# Patient Record
Sex: MALE | Race: WHITE | NOT HISPANIC OR LATINO | URBAN - METROPOLITAN AREA
[De-identification: names, ages, dates, MRNs, and addresses within clinical notes are randomized per-mention and may not be internally consistent; named-entity substitution may affect disease eponyms.]

---

## 2018-09-06 ENCOUNTER — OFFICE VISIT (OUTPATIENT)
Dept: URBAN - METROPOLITAN AREA CLINIC 32 | Facility: CLINIC | Age: 43
End: 2018-09-06
Payer: COMMERCIAL

## 2018-09-06 PROCEDURE — 99213 OFFICE O/P EST LOW 20 MIN: CPT | Performed by: OPHTHALMOLOGY

## 2018-09-06 RX ORDER — BIMATOPROST 0.1 MG/ML
0.01 % SOLUTION/ DROPS OPHTHALMIC
Qty: 7.5 | Refills: 4 | Status: INACTIVE
Start: 2018-09-06 | End: 2019-01-24

## 2018-09-06 ASSESSMENT — INTRAOCULAR PRESSURE
OD: 11
OS: 11

## 2018-09-06 NOTE — IMPRESSION/PLAN
Impression: Low-tension glaucoma, bilateral, mild stage: V03.1565. ONH drusen ou --
IOP OU lowered effectively with lumigan -
ONH stable ou Plan: Discussed diagnosis, explained and understood by patient. Discussed IOP/ONH/Glaucoma management and risks. continue Lumigan OU qhs.
stressed compliance with gtts and risk of progression and vision loss if not compliant with treatment. Will continue to monitor condition and symptoms.

## 2019-01-24 ENCOUNTER — OFFICE VISIT (OUTPATIENT)
Dept: URBAN - METROPOLITAN AREA CLINIC 32 | Facility: CLINIC | Age: 44
End: 2019-01-24
Payer: COMMERCIAL

## 2019-01-24 DIAGNOSIS — H25.13 AGE-RELATED NUCLEAR CATARACT, BILATERAL: ICD-10-CM

## 2019-01-24 PROCEDURE — 99213 OFFICE O/P EST LOW 20 MIN: CPT | Performed by: OPHTHALMOLOGY

## 2019-01-24 RX ORDER — BIMATOPROST 0.1 MG/ML
0.01 % SOLUTION/ DROPS OPHTHALMIC
Qty: 7.5 | Refills: 4 | Status: INACTIVE
Start: 2019-01-24 | End: 2019-11-04

## 2019-01-24 ASSESSMENT — INTRAOCULAR PRESSURE
OD: 11
OS: 12

## 2019-01-24 NOTE — IMPRESSION/PLAN
Impression: Low-tension glaucoma, bilateral, mild stage: M52.5672. ONH drusen ou --
IOP OU lowered effectively with lumigan -
ONH stable ou Plan: Discussed diagnosis, explained and understood by patient. Discussed IOP/ONH/Glaucoma management and risks. continue Lumigan OU qhs.
stressed compliance with gtts and risk of progression and vision loss if not compliant with treatment. Will continue to monitor condition and symptoms.

## 2019-06-05 ENCOUNTER — TESTING ONLY (OUTPATIENT)
Dept: URBAN - METROPOLITAN AREA CLINIC 32 | Facility: CLINIC | Age: 44
End: 2019-06-05
Payer: COMMERCIAL

## 2019-06-05 PROCEDURE — 92133 CPTRZD OPH DX IMG PST SGM ON: CPT | Performed by: OPHTHALMOLOGY

## 2019-06-05 PROCEDURE — 92083 EXTENDED VISUAL FIELD XM: CPT | Performed by: OPHTHALMOLOGY

## 2019-06-13 ENCOUNTER — OFFICE VISIT (OUTPATIENT)
Dept: URBAN - METROPOLITAN AREA CLINIC 32 | Facility: CLINIC | Age: 44
End: 2019-06-13
Payer: COMMERCIAL

## 2019-06-13 PROCEDURE — 99213 OFFICE O/P EST LOW 20 MIN: CPT | Performed by: OPHTHALMOLOGY

## 2019-06-13 ASSESSMENT — INTRAOCULAR PRESSURE
OD: 12
OS: 12

## 2019-06-13 NOTE — IMPRESSION/PLAN
Impression: Low-tension glaucoma, bilateral, mild stage: E36.5976. ONH drusen OU ONH/IOP stable OU Plan: Discussed diagnosis, explained and understood by patient. Discussed IOP/ONH/Glaucoma management and risks. Reviewed VF and OCT. Continue Lumigan OU qhs. Will continue to monitor condition and symptoms.

## 2019-10-31 ENCOUNTER — OFFICE VISIT (OUTPATIENT)
Dept: URBAN - METROPOLITAN AREA CLINIC 32 | Facility: CLINIC | Age: 44
End: 2019-10-31

## 2019-10-31 PROCEDURE — 99212 OFFICE O/P EST SF 10 MIN: CPT | Performed by: OPHTHALMOLOGY

## 2019-10-31 ASSESSMENT — INTRAOCULAR PRESSURE
OS: 12
OD: 11

## 2019-10-31 NOTE — IMPRESSION/PLAN
Impression: Low-tension glaucoma, bilateral, mild stage: X56.2697. ONH drusen OU - ONH stable ou - IOP doing well ou - Plan: Discussed diagnosis,  IOP/ONH/Glaucoma management and risks. Continue Lumigan OU qhs. 
continue to monitor condition and symptoms. Reconsult PRN with Dr Leif Garcia.

## 2020-02-26 ENCOUNTER — OFFICE VISIT (OUTPATIENT)
Dept: URBAN - METROPOLITAN AREA CLINIC 32 | Facility: CLINIC | Age: 45
End: 2020-02-26

## 2020-02-26 PROCEDURE — 99213 OFFICE O/P EST LOW 20 MIN: CPT | Performed by: OPTOMETRIST

## 2020-02-26 ASSESSMENT — INTRAOCULAR PRESSURE
OS: 11
OD: 10

## 2020-02-26 NOTE — IMPRESSION/PLAN
Impression: Low-tension glaucoma, bilateral, mild stage: J10.5629. ONH drusen OU - ONH stable ou - IOP doing well ou - Plan: Discussed diagnosis,  IOP/ONH/Glaucoma management and risks. Continue Lumigan OU qhs. 
continue to monitor condition and symptoms.

## 2020-06-26 ENCOUNTER — OFFICE VISIT (OUTPATIENT)
Dept: URBAN - METROPOLITAN AREA CLINIC 32 | Facility: CLINIC | Age: 45
End: 2020-06-26

## 2020-06-26 DIAGNOSIS — H40.1231 LOW-TENSION GLAUCOMA, BILATERAL, MILD STAGE: Primary | ICD-10-CM

## 2020-06-26 PROCEDURE — 99214 OFFICE O/P EST MOD 30 MIN: CPT | Performed by: OPTOMETRIST

## 2020-06-26 PROCEDURE — 99213 OFFICE O/P EST LOW 20 MIN: CPT | Performed by: OPTOMETRIST

## 2020-06-26 ASSESSMENT — INTRAOCULAR PRESSURE
OS: 10
OD: 10

## 2020-06-26 NOTE — IMPRESSION/PLAN
Impression: Low-tension glaucoma, bilateral, mild stage: D22.5091. ONH drusen OU - ONH stable ou - IOP doing well ou - Plan: Discussed diagnosis,  IOP/ONH/Glaucoma management and risks. Continue Lumigan OU qhs. 
continue to monitor condition and symptoms.

## 2020-10-21 ENCOUNTER — OFFICE VISIT (OUTPATIENT)
Dept: URBAN - METROPOLITAN AREA CLINIC 32 | Facility: CLINIC | Age: 45
End: 2020-10-21
Payer: COMMERCIAL

## 2020-10-21 DIAGNOSIS — H52.4 PRESBYOPIA: ICD-10-CM

## 2020-10-21 PROCEDURE — 92014 COMPRE OPH EXAM EST PT 1/>: CPT | Performed by: OPTOMETRIST

## 2020-10-21 PROCEDURE — 92083 EXTENDED VISUAL FIELD XM: CPT | Performed by: OPTOMETRIST

## 2020-10-21 PROCEDURE — 92133 CPTRZD OPH DX IMG PST SGM ON: CPT | Performed by: OPTOMETRIST

## 2020-10-21 ASSESSMENT — VISUAL ACUITY
OD: 20/20
OS: 20/20

## 2020-10-21 ASSESSMENT — INTRAOCULAR PRESSURE
OS: 13
OD: 13

## 2020-10-21 NOTE — IMPRESSION/PLAN
Impression: Low-tension glaucoma, bilateral, mild stage: H65.6940. ONH drusen OU - ONH stable ou - IOP doing well ou - Plan: Discussed diagnosis,  IOP/ONH/Glaucoma management and risks. Continue Lumigan OU qhs. 
continue to monitor condition and symptoms.  early arcuate defects OU

## 2021-02-23 ENCOUNTER — OFFICE VISIT (OUTPATIENT)
Dept: URBAN - METROPOLITAN AREA CLINIC 32 | Facility: CLINIC | Age: 46
End: 2021-02-23
Payer: COMMERCIAL

## 2021-02-23 PROCEDURE — 99214 OFFICE O/P EST MOD 30 MIN: CPT | Performed by: OPTOMETRIST

## 2021-02-23 RX ORDER — BIMATOPROST 0.1 MG/ML
0.01 % SOLUTION/ DROPS OPHTHALMIC
Qty: 7.5 | Refills: 11 | Status: ACTIVE
Start: 2021-02-23

## 2021-02-23 ASSESSMENT — INTRAOCULAR PRESSURE
OS: 12
OD: 14

## 2021-02-23 NOTE — IMPRESSION/PLAN
Impression: Low-tension glaucoma, bilateral, mild stage: B89.8393. ONH drusen OU - ONH stable ou - IOP doing well ou - Plan: Discussed diagnosis,  IOP/ONH/Glaucoma management and risks. Continue Lumigan OU qhs. 
continue to monitor condition and symptoms.  early arcuate defects OU

## 2021-06-30 ENCOUNTER — OFFICE VISIT (OUTPATIENT)
Dept: URBAN - METROPOLITAN AREA CLINIC 32 | Facility: CLINIC | Age: 46
End: 2021-06-30
Payer: COMMERCIAL

## 2021-06-30 PROCEDURE — 92133 CPTRZD OPH DX IMG PST SGM ON: CPT | Performed by: OPTOMETRIST

## 2021-06-30 PROCEDURE — 99214 OFFICE O/P EST MOD 30 MIN: CPT | Performed by: OPTOMETRIST

## 2021-06-30 ASSESSMENT — INTRAOCULAR PRESSURE
OD: 12
OS: 10

## 2021-06-30 NOTE — IMPRESSION/PLAN
Impression: Low-tension glaucoma, bilateral, mild stage: Z14.0814. ONH drusen OU - ONH stable ou - IOP doing well ou - Plan: Discussed diagnosis,  IOP/ONH/Glaucoma management and risks. Continue Lumigan OU qhs. 
continue to monitor condition and symptoms.  early arcuate defects OU